# Patient Record
(demographics unavailable — no encounter records)

---

## 2025-06-12 NOTE — HISTORY OF PRESENT ILLNESS
[de-identified] : Date of initial evaluation: 06/12/2025 Patient age: 25 year Body part causing symptoms: right knee Location of the pain: anterior, lateral Pain score today: 4/10 Duration of symptoms: 1 month History of injury: none Activities that worsen the pain: walking, bending Radicular symptoms: none Medications attempted for this problem: none PT for this problem: none Injections for this problem: none Previous surgery on this body part: none Prior imaging: none Occupation:    previous shoulder problem is resolved

## 2025-06-12 NOTE — DISCUSSION/SUMMARY
[de-identified] : (Impression) -right knee patellofemoral syndrome, IT band syndrome    The diagnosis was explained in detail. The potential non-surgical and surgical treatments were reviewed. The relative risks and benefits of each option were considered relative to the patient age, activity level, medical history, symptom severity and previously attempted treatments.  The patient was advised to consult with their primary medical provider prior to initiation of any new medications to reduce the risk of adverse effects specific to their long-term home medications and medical history. The risk of gastrointestinal irritation and kidney injury specific to long-term NSAID use was discussed.    (Plan)  -Physical therapy recommended for stretching and strengthening. -Over the counter NSAID for pain and inflammation, take as needed. -MRI is deferred at this time. -Follow up in 6 to 8 weeks. If symptoms persist consider MRI.   (MDM) Problem Complexity -Moderate: chronic illness with exacerbation   Risk -Low: over the counter medication   Visit Type -Established

## 2025-06-12 NOTE — IMAGING
[de-identified] : (Exam: Knee)   Laterality is right    Patient is in no acute distress, alert and oriented Sensation is grossly intact to light touch in the foot Motor function is 5/5 in the foot Capillary refill is less than 2 seconds in the toes Lymphadenopathy is not present Peripheral edema is not present   Skin is intact Effusion is trace Atrophy is not present Antalgic gait is not present   Medial joint line tenderness is not present Lateral joint line tenderness is not present Patellar tenderness is present IT band tenderness is present Calf tenderness is not present   Knee extension is 0 Knee flexion is 135   Quadriceps strength is 5/5 Hamstring strength is 5/5   Lachman is normal Posterior drawer is normal Varus stress stability is normal Valgus stress stability is normal   Medial Lindsay test is normal Lateral Lindsay test is normal Patellofemoral grind test is abnormal Patellar apprehension test is normal  [Right] : right knee [All Views] : anteroposterior, lateral, skyline, and anteroposterior standing [There are no fractures, subluxations or dislocations. No significant abnormalities are seen] : There are no fractures, subluxations or dislocations. No significant abnormalities are seen